# Patient Record
Sex: FEMALE
[De-identification: names, ages, dates, MRNs, and addresses within clinical notes are randomized per-mention and may not be internally consistent; named-entity substitution may affect disease eponyms.]

---

## 2021-03-25 ENCOUNTER — TRANSCRIPTION ENCOUNTER (OUTPATIENT)
Age: 26
End: 2021-03-25

## 2022-12-06 PROBLEM — Z00.00 ENCOUNTER FOR PREVENTIVE HEALTH EXAMINATION: Status: ACTIVE | Noted: 2022-12-06

## 2023-01-24 ENCOUNTER — LABORATORY RESULT (OUTPATIENT)
Age: 28
End: 2023-01-24

## 2023-01-24 ENCOUNTER — APPOINTMENT (OUTPATIENT)
Dept: GASTROENTEROLOGY | Facility: CLINIC | Age: 28
End: 2023-01-24
Payer: COMMERCIAL

## 2023-01-24 VITALS
SYSTOLIC BLOOD PRESSURE: 122 MMHG | DIASTOLIC BLOOD PRESSURE: 77 MMHG | BODY MASS INDEX: 18.61 KG/M2 | HEIGHT: 70 IN | HEART RATE: 65 BPM | WEIGHT: 130 LBS

## 2023-01-24 DIAGNOSIS — K59.04 CHRONIC IDIOPATHIC CONSTIPATION: ICD-10-CM

## 2023-01-24 PROCEDURE — 99205 OFFICE O/P NEW HI 60 MIN: CPT

## 2023-02-05 LAB
ANA PAT FLD IF-IMP: NORMAL
ANA SER IF-ACNC: ABNORMAL
BARLEY IGE QN: <0.1 KUA/L
CHERRY IGE QN: <0.1 KUA/L
COW MILK IGE QN: <0.1 KUA/L
CRAB IGE QN: <0.1 KUA/L
DEPRECATED BARLEY IGE RAST QL: 0
DEPRECATED CHERRY IGE RAST QL: 0
DEPRECATED COW MILK IGE RAST QL: 0
DEPRECATED CRAB IGE RAST QL: 0
DEPRECATED EGG WHITE IGE RAST QL: 0
DEPRECATED OAT IGE RAST QL: 0
DEPRECATED PEANUT IGE RAST QL: 0
DEPRECATED RYE IGE RAST QL: 0
DEPRECATED SOYBEAN IGE RAST QL: 0
DEPRECATED WHEAT IGE RAST QL: 0
EGG WHITE IGE QN: <0.1 KUA/L
FOLATE SERPL-MCNC: 11.1 NG/ML
OAT IGE QN: <0.1 KUA/L
PEANUT IGE QN: <0.1 KUA/L
RYE IGE QN: <0.1 KUA/L
SOYBEAN IGE QN: <0.1 KUA/L
TOTAL IGE SMQN RAST: 37 KU/L
TRYPTASE: 2.9 UG/L
VIT B12 SERPL-MCNC: 563 PG/ML
WHEAT IGE QN: <0.1 KUA/L

## 2023-02-10 ENCOUNTER — NON-APPOINTMENT (OUTPATIENT)
Age: 28
End: 2023-02-10

## 2023-02-24 ENCOUNTER — APPOINTMENT (OUTPATIENT)
Dept: GASTROENTEROLOGY | Facility: CLINIC | Age: 28
End: 2023-02-24
Payer: COMMERCIAL

## 2023-02-24 PROCEDURE — 99215 OFFICE O/P EST HI 40 MIN: CPT | Mod: 95

## 2023-02-24 NOTE — ASSESSMENT
[FreeTextEntry1] : 27F presents for GI eval EXTREME BLOATING BURPING CONSTIPIATION TRAPPED GAS PAIN s/p  SIBO with recurrence 2021 and possible food poisoning induced abdominal pain and constipation found to have ?c diff on GI map, +LUIS\par - rheumatology referral\par - journal symptoms x 2wks\par - prokinetic trial , r/b/a/i discussed and patient agreeable\par - f/u in march

## 2023-02-24 NOTE — REASON FOR VISIT
[Initial Evaluation] : an initial evaluation [Home] : at home, [unfilled] , at the time of the visit. [Medical Office: ([facility])___] : at the medical office located in  [Patient] : the patient [Self] : self

## 2023-02-24 NOTE — HISTORY OF PRESENT ILLNESS
[FreeTextEntry1] : 27F presents for GI eval\par 2/24/23\par - +LUIS\par - rashes under her arm saw derm for it\par - bloated all the time, wakes up bloated \par - did cscope prep didn’t help\par - TRIO SMART NEGATIVE\par - IBS SMART NEGATIVE\par \par PREVIOUS HX\par before 2020 NO GI ISSUES\par lot of antibiotics throughout teens \par april 2020  out of nowhere EXTREME BLOATING BURPING\par then june 2020 stool test US H pylori  then took TRIPLE THERAPY then NEGATIVE for HP\par then saw GI in [place] EGD nml\par then did SIBO test=XIFAXAN FELT BETTER ATE NORMALLY; 100% normal for 6mo-1 year\par then 2021 SEVERE BLOATING 24/7 all food bothers her\par \par 2021 XIFAXAN did nothing \par jan 2022 dr baker retest sibo +HYDROGEN (have the test results) --> LOW FODMAP didn’t help\par may 2022 ?mild food poisoning,  candibactin AR+ BR\par then TUNA BURGER + FRENFCH ONION SOUP -->BAD ABDOMINAL PAIN\par started having constipation (usually was more diarrhea)\par GI MAP = c diff, high zonulin\par +LUIS\par lots of herbs then OCT 2022 after drinking EXTREME ABD PAIN AND DIARRHEA for 2 days SMELLED AWFUL ROTTEN EGGS 2-3days \par NOV 2022 naturopath rx xifaxan + flagyl= NO RELIEF THE WORST ITS EVER BEEN\par do cscope prep did HELP TEMPORARILY \par NOW\par #1 EXTREME BLOATING\par #2. BRISTOL 1-7, has to take baljinder, mg. LITTLE BITS COME OUT\par #3 PAIN TRAPPED GAS LOWER ABD/PELVIS\par not eating, tired after eating \par vaginal, , LYME DISEASE at age 5 IV ATB, ATB FOR ACNE, no ED, athlete not extreme goal 135, high school college ETOH/marijuana, \par FHX- mom liposarcoma\par no abd or pelvic surgeries \par last antibiotics in JANUARY \par \par current rx= motilpro, spironolactone, collagen, lolooestrin, mg citrate \par \par ROS: as above with additional details below if needed\par Constitutional: No weight loss, fevers\par ENT: No deafness\par Eyes: No blindness\par Neck: No lymphadenopathy\par Chest: No shortness of breath,  cough\par Cardiac: No chest pain, palpitations\par Vascular: No leg swelling\par GI: No abdominal pain, nausea, vomiting, diarrhea, constipation, rectal bleeding, melena, dysphagia, unless otherwise noted in HPI\par : No dysuria, dark urine\par Skin: No rashes, lesions, jaundice\par Heme: No bleeding\par Endocrine: No DM unless otherwise stated in HPI\par \par Physical Exam:\par VS noted below\par General: alert, comfortable,in no acute stress\par Eyes: normal sclera, anicteric\par Neck: normal, supple, no neck mass\par Pulm: no respiratory distress\par Heart: s1s2\par Abd: soft, non-tender, non-distended, normal bowel sounds; no appreciable hepatosplenomegaly, no masses palpated\par Ext: warm and well perfused, no edema\par Skin: no rashes, no jaundice\par Neuro: alert, grossly nonfocal\par \par Labs/imaging/prior endoscopic results were reviewed to the extent available and pertinent findings noted\par \par

## 2023-03-10 ENCOUNTER — TRANSCRIPTION ENCOUNTER (OUTPATIENT)
Age: 28
End: 2023-03-10

## 2023-03-28 ENCOUNTER — APPOINTMENT (OUTPATIENT)
Dept: GASTROENTEROLOGY | Facility: CLINIC | Age: 28
End: 2023-03-28
Payer: COMMERCIAL

## 2023-03-28 VITALS
DIASTOLIC BLOOD PRESSURE: 79 MMHG | OXYGEN SATURATION: 100 % | RESPIRATION RATE: 18 BRPM | BODY MASS INDEX: 18.61 KG/M2 | HEART RATE: 61 BPM | HEIGHT: 70 IN | SYSTOLIC BLOOD PRESSURE: 120 MMHG | WEIGHT: 130 LBS

## 2023-03-28 PROCEDURE — 99215 OFFICE O/P EST HI 40 MIN: CPT

## 2023-03-29 ENCOUNTER — TRANSCRIPTION ENCOUNTER (OUTPATIENT)
Age: 28
End: 2023-03-29

## 2023-03-29 NOTE — HISTORY OF PRESENT ILLNESS
[FreeTextEntry1] : 27F presents for GI eval\par 3/28/23\par - magnesium citrate 150mg\par - tried motegrity and was extreme\par - seeing rheum 4/13\par \par 2/24/23\par - +LUIS\par - rashes under her arm saw derm for it\par - bloated all the time, wakes up bloated \par - did cscope prep didn’t help\par - TRIO SMART NEGATIVE\par - IBS SMART NEGATIVE\par \par PREVIOUS HX\par before 2020 NO GI ISSUES\par lot of antibiotics throughout teens \par april 2020  out of nowhere EXTREME BLOATING BURPING\par then june 2020 stool test US H pylori  then took TRIPLE THERAPY then NEGATIVE for HP\par then saw GI in [place] EGD nml\par then did SIBO test=XIFAXAN FELT BETTER ATE NORMALLY; 100% normal for 6mo-1 year\par then 2021 SEVERE BLOATING 24/7 all food bothers her\par \par 2021 XIFAXAN did nothing \par jan 2022 dr baker retest sibo +HYDROGEN (have the test results) --> LOW FODMAP didn’t help\par may 2022 ?mild food poisoning,  candibactin AR+ BR\par then TUNA BURGER + FRENFCH ONION SOUP -->BAD ABDOMINAL PAIN\par started having constipation (usually was more diarrhea)\par GI MAP = c diff, high zonulin\par +LUIS\par lots of herbs then OCT 2022 after drinking EXTREME ABD PAIN AND DIARRHEA for 2 days SMELLED AWFUL ROTTEN EGGS 2-3days \par NOV 2022 naturopath rx xifaxan + flagyl= NO RELIEF THE WORST ITS EVER BEEN\par do cscope prep did HELP TEMPORARILY \par NOW\par #1 EXTREME BLOATING\par #2. BRISTOL 1-7, has to take baljinder, mg. LITTLE BITS COME OUT\par #3 PAIN TRAPPED GAS LOWER ABD/PELVIS\par not eating, tired after eating \par vaginal, , LYME DISEASE at age 5 IV ATB, ATB FOR ACNE, no ED, athlete not extreme goal 135, high school college ETOH/marijuana, \par FHX- mom liposarcoma\par no abd or pelvic surgeries \par last antibiotics in JANUARY \par \par current rx= motilpro, spironolactone, collagen, lolooestrin, mg citrate \par \par ROS: as above with additional details below if needed\par Constitutional: No weight loss, fevers\par ENT: No deafness\par Eyes: No blindness\par Neck: No lymphadenopathy\par Chest: No shortness of breath,  cough\par Cardiac: No chest pain, palpitations\par Vascular: No leg swelling\par GI: No abdominal pain, nausea, vomiting, diarrhea, constipation, rectal bleeding, melena, dysphagia, unless otherwise noted in HPI\par : No dysuria, dark urine\par Skin: No rashes, lesions, jaundice\par Heme: No bleeding\par Endocrine: No DM unless otherwise stated in HPI\par \par Physical Exam:\par VS noted below\par General: alert, comfortable,in no acute stress\par Eyes: normal sclera, anicteric\par Neck: normal, supple, no neck mass\par Pulm: no respiratory distress\par Heart: s1s2\par Abd: soft, non-tender, non-distended, normal bowel sounds; no appreciable hepatosplenomegaly, no masses palpated\par Ext: warm and well perfused, no edema\par Skin: no rashes, no jaundice\par Neuro: alert, grossly nonfocal\par \par Labs/imaging/prior endoscopic results were reviewed to the extent available and pertinent findings noted\par \par

## 2023-03-29 NOTE — ASSESSMENT
[FreeTextEntry1] : 27F presents for GI eval EXTREME BLOATING BURPING CONSTIPIATION TRAPPED GAS PAIN s/p  SIBO with recurrence 2021 and possible food poisoning induced abdominal pain and constipation found to have ?c diff on GI map, +LUIS\par - rheumatology referral\par - MRE to eval IBD in setting of +LUIS\par - consider egd, cscope but pt hesitant\par - optimize intestinal permeability\par - prokinetic trial - motegrity too harsh\par - f/u 6wks

## 2023-04-13 ENCOUNTER — APPOINTMENT (OUTPATIENT)
Dept: RHEUMATOLOGY | Facility: CLINIC | Age: 28
End: 2023-04-13
Payer: COMMERCIAL

## 2023-04-13 ENCOUNTER — NON-APPOINTMENT (OUTPATIENT)
Age: 28
End: 2023-04-13

## 2023-04-13 VITALS
OXYGEN SATURATION: 97 % | SYSTOLIC BLOOD PRESSURE: 127 MMHG | HEART RATE: 74 BPM | WEIGHT: 127 LBS | TEMPERATURE: 97.2 F | BODY MASS INDEX: 18.18 KG/M2 | DIASTOLIC BLOOD PRESSURE: 81 MMHG | HEIGHT: 70 IN

## 2023-04-13 DIAGNOSIS — R76.8 OTHER SPECIFIED ABNORMAL IMMUNOLOGICAL FINDINGS IN SERUM: ICD-10-CM

## 2023-04-13 DIAGNOSIS — R14.0 ABDOMINAL DISTENSION (GASEOUS): ICD-10-CM

## 2023-04-13 PROCEDURE — 36415 COLL VENOUS BLD VENIPUNCTURE: CPT

## 2023-04-13 PROCEDURE — 99204 OFFICE O/P NEW MOD 45 MIN: CPT | Mod: 25

## 2023-04-13 NOTE — ASSESSMENT
[FreeTextEntry1] : 27-year-old woman referred for rheumatology evaluation.  Patient following closely with gastroenterologist for evaluation of abdominal symptoms, bloating and constipation, noted to have positive LUIS on recent blood test.  Patient scheduled for MR enterography next week for further evaluation.  Patient with LUIS positive 1: 160 with dense fine speckled pattern, with reports of symptoms suggestive of Raynaud's like color changes of the fingers.  Patient otherwise does not meet criteria for an underlying connective tissue disease, LUIS positive with a dense fine speckled pattern; this pattern suggest the presence of DFS 70 antibody which is a low prevalence in systemic autoimmune rheumatic diseases.  Given LUIS positive and Raynaud's-like symptoms, will obtain further testing today with AVISE CTD testing in office, further management pending results.

## 2023-04-13 NOTE — HISTORY OF PRESENT ILLNESS
[FreeTextEntry1] : April 13, 2023\par Patient referred for rheumatology evaluation\par Patient evaluated by GI\par Recently noted to have positive LUIS in July 2020 with 1: 640 dense fine speckled pattern\par Patient with repeat in January 2023 \par Patient reports GI symptoms on and off since 2020\par Diagnosed with both H pylori and sibo, status post treatment\par \par Next step, MR enterography scheduled for next week\par Feeling anxious regarding test results and further testing\par GI symptoms include feeling bloated and uncomfortable \par No food allergies noted\par Started magnesium, ginger, and artichoke and if feels not helping, take motegrity small dose\par Having bowl movements but incomplete\par Previously regular since May of last year\par \par No other symptoms, predominantly bloating, gas, and abdominal pain\par Takes OCP does not have periods\par Does not take nsaids\par Eats a good diet\par Tries to remain gluten free and dairy free, although allergy testing negative, does not feel better\par \par Possibly diagnosis of leaky gut, recently started GI revive\par Maybe mild Crohn's, awaiting MR enterography\par Blood tests normal\par \par Exercise does not make a difference in terms of GI symptoms\par +weight loss due to restrictive eating\par Normal weight 135-140, now 127 and 5'10\par \par Sister with GI issues, thinks related to anxiety more diarrhea\par No family history of IBD, SLE or RA\par \par Mother with DSAP\par Patient reports sometimes hands turn white in the extreme cold\par \par Previously treated with  accutane\par Has been taking spironolactone, three years \par lo lo estrin\par \par No photosensitivity\par no oral ulcers\par No sicca symptoms\par No hair loss\par No joint swelling\par Sometimes when previously taking Accutane pain in the hands\par No dysphagia\par no GERD\par \par motegrity as needed

## 2023-04-13 NOTE — PHYSICAL EXAM
[General Appearance - Alert] : alert [General Appearance - In No Acute Distress] : in no acute distress [General Appearance - Well-Appearing] : healthy appearing [Sclera] : the sclera and conjunctiva were normal [Examination Of The Oral Cavity] : the lips and gums were normal [Oropharynx] : the oropharynx was normal [Exaggerated Use Of Accessory Muscles For Inspiration] : no accessory muscle use [Edema] : there was no peripheral edema [Abnormal Walk] : normal gait [Nail Clubbing] : no clubbing  or cyanosis of the fingernails [Musculoskeletal - Swelling] : no joint swelling seen [Motor Tone] : muscle strength and tone were normal [] : no rash [Oriented To Time, Place, And Person] : oriented to person, place, and time [Impaired Insight] : insight and judgment were intact [Affect] : the affect was normal [FreeTextEntry1] : No active synovitis of the upper and lower extremities bilaterally.

## 2023-04-19 ENCOUNTER — RESULT REVIEW (OUTPATIENT)
Age: 28
End: 2023-04-19

## 2023-04-19 ENCOUNTER — OUTPATIENT (OUTPATIENT)
Dept: OUTPATIENT SERVICES | Facility: HOSPITAL | Age: 28
LOS: 1 days | End: 2023-04-19

## 2023-04-19 ENCOUNTER — APPOINTMENT (OUTPATIENT)
Dept: MRI IMAGING | Facility: CLINIC | Age: 28
End: 2023-04-19
Payer: COMMERCIAL

## 2023-04-19 PROCEDURE — 74183 MRI ABD W/O CNTR FLWD CNTR: CPT | Mod: 26

## 2023-04-19 PROCEDURE — 72197 MRI PELVIS W/O & W/DYE: CPT | Mod: 26

## 2023-04-22 ENCOUNTER — TRANSCRIPTION ENCOUNTER (OUTPATIENT)
Age: 28
End: 2023-04-22

## 2023-04-24 ENCOUNTER — NON-APPOINTMENT (OUTPATIENT)
Age: 28
End: 2023-04-24

## 2023-04-24 LAB
ANTI-BETA2 GLYCOPROTEIN 1 IGG CONCENTRATION: 3 U/ML
ANTI-BETA2 GLYCOPROTEIN 1 IGM CONCENTRATION: <2.4 U/ML
ANTI-CARDIOLIPIN IGG CONCENTRATION: 1 GPL
ANTI-CARDIOLIPIN IGM CONCENTRATION: 1 MPL
ANTI-CENP IGG CONCENTRATION: <0.4 U/ML
ANTI-CYCLIC CITRULLINATED PEPTIDE IGG CONCENTRATION: 1 U/ML
ANTI-DOUBLE-STRANDED DNA IGG CONCENTRATION: 17 IU/ML
ANTI-JO-1 IGG CONCENTRATION: <0.3 U/ML
ANTI-NUCLEAR ANTIBODIES - CYTOPLASMIC PATTERN: NORMAL
ANTI-NUCLEAR ANTIBODIES - PRIMARY NUCLEAR PATTERN: NORMAL
ANTI-NUCLEAR ANTIBODIES - PRIMARY PATTERN TITER: ABNORMAL
ANTI-NUCLEAR ANTIBODIES IGG CONCENTRATION: 15 UNITS
ANTI-RNA POL III IGG CONCENTRATION: <0.7 U/ML
ANTI-RNP70 IGG CONCENTRATION: 3 U/ML
ANTI-RO52 IGG CONCENTRATION: <0.3 U/ML
ANTI-RO60 IGG CONCENTRATION: <0.4 U/ML
ANTI-SCL-70 IGG CONCENTRATION: <0.6 U/ML
ANTI-SMITH IGG CONCENTRATION: <0.7 U/ML
ANTI-SS-B (LA) IGG CONCENTRATION: <0.4 U/ML
ANTI-THYROGLOBULIN IGG CONCENTRATION: 13 IU/ML
ANTI-THYROID PEROXIDASE IGG CONCENTRATION: <4 IU/ML
ANTI-U1RNP IGG CONCENTRATION: 3 U/ML
AVISE LUPUS INDEX: -3
AVISE LUPUS RESULT: NEGATIVE
B-LYMPHOCYTE-BOUND C4D (BC4D) LEVEL: 11
ERYTHROCYTE-BOUND C4D (EC4D) LEVEL: 2
RHEUMATOID FACTOR (IGA) CONCENTRATION: 2 IU/ML
RHEUMATOID FACTOR (IGM) CONCENTRATION: <0.6 IU/ML

## 2023-04-25 ENCOUNTER — TRANSCRIPTION ENCOUNTER (OUTPATIENT)
Age: 28
End: 2023-04-25

## 2023-04-27 ENCOUNTER — APPOINTMENT (OUTPATIENT)
Dept: GASTROENTEROLOGY | Facility: CLINIC | Age: 28
End: 2023-04-27

## 2023-04-28 ENCOUNTER — APPOINTMENT (OUTPATIENT)
Dept: GASTROENTEROLOGY | Facility: CLINIC | Age: 28
End: 2023-04-28
Payer: COMMERCIAL

## 2023-04-28 PROCEDURE — 99214 OFFICE O/P EST MOD 30 MIN: CPT | Mod: 95

## 2023-05-01 NOTE — REASON FOR VISIT
[Home] : at home, [unfilled] , at the time of the visit. [Medical Office: ([facility])___] : at the medical office located in  [Patient] : the patient [Self] : self [Follow-up] : a follow-up of an existing diagnosis

## 2023-05-01 NOTE — ASSESSMENT
[FreeTextEntry1] : 27F presents for GI eval EXTREME BLOATING BURPING CONSTIPIATION TRAPPED GAS PAIN s/p  SIBO with recurrence 2021 and possible food poisoning induced abdominal pain and constipation found to have ?c diff on GI map, +LUIS\par - rheumatology referral negative\par - MRE to eval IBD in setting of +LUIS\par - cscope; r/b/a/i discussed and patient agreeabl (EGD PERFORMED)\par - optimize intestinal permeability\par - prokinetic trial - motegrity too effective but uses as needed\par - f/u after above

## 2023-05-01 NOTE — HISTORY OF PRESENT ILLNESS
[FreeTextEntry1] : 27F presents for GI eval\par 4/28/23\par - rheum workup negative\par - not feeling well\par \par \par 3/28/23\par - magnesium citrate 150mg\par - tried motegrity and was extreme\par - seeing rheum 4/13\par \par 2/24/23\par - +LUIS\par - rashes under her arm saw derm for it\par - bloated all the time, wakes up bloated \par - did cscope prep didn’t help\par - TRIO SMART NEGATIVE\par - IBS SMART NEGATIVE\par \par PREVIOUS HX\par before 2020 NO GI ISSUES\par lot of antibiotics throughout teens \par april 2020  out of nowhere EXTREME BLOATING BURPING\par then june 2020 stool test US H pylori  then took TRIPLE THERAPY then NEGATIVE for HP\par then saw GI in [place] EGD nml\par then did SIBO test=XIFAXAN FELT BETTER ATE NORMALLY; 100% normal for 6mo-1 year\par then 2021 SEVERE BLOATING 24/7 all food bothers her\par \par 2021 XIFAXAN did nothing \par jan 2022 dr baker retest sibo +HYDROGEN (have the test results) --> LOW FODMAP didn’t help\par may 2022 ?mild food poisoning,  candibactin AR+ BR\par then TUNA BURGER + FRENFCH ONION SOUP -->BAD ABDOMINAL PAIN\par started having constipation (usually was more diarrhea)\par GI MAP = c diff, high zonulin\par +LUIS\par lots of herbs then OCT 2022 after drinking EXTREME ABD PAIN AND DIARRHEA for 2 days SMELLED AWFUL ROTTEN EGGS 2-3days \par NOV 2022 naturopath rx xifaxan + flagyl= NO RELIEF THE WORST ITS EVER BEEN\par do cscope prep did HELP TEMPORARILY \par NOW\par #1 EXTREME BLOATING\par #2. BRISTOL 1-7, has to take baljinder, mg. LITTLE BITS COME OUT\par #3 PAIN TRAPPED GAS LOWER ABD/PELVIS\par not eating, tired after eating \par vaginal, , LYME DISEASE at age 5 IV ATB, ATB FOR ACNE, no ED, athlete not extreme goal 135, high school college ETOH/marijuana, \par FHX- mom liposarcoma\par no abd or pelvic surgeries \par last antibiotics in JANUARY \par \par current rx= motilpro, spironolactone, collagen, lolooestrin, mg citrate \par \par ROS: as above with additional details below if needed\par Constitutional: No weight loss, fevers\par ENT: No deafness\par Eyes: No blindness\par Neck: No lymphadenopathy\par Chest: No shortness of breath,  cough\par Cardiac: No chest pain, palpitations\par Vascular: No leg swelling\par GI: No abdominal pain, nausea, vomiting, diarrhea, constipation, rectal bleeding, melena, dysphagia, unless otherwise noted in HPI\par : No dysuria, dark urine\par Skin: No rashes, lesions, jaundice\par Heme: No bleeding\par Endocrine: No DM unless otherwise stated in HPI\par \par Physical Exam:\par VS noted below\par General: alert, comfortable,in no acute stress\par Eyes: normal sclera, anicteric\par Neck: normal, supple, no neck mass\par Pulm: no respiratory distress\par Heart: s1s2\par Abd: soft, non-tender, non-distended, normal bowel sounds; no appreciable hepatosplenomegaly, no masses palpated\par Ext: warm and well perfused, no edema\par Skin: no rashes, no jaundice\par Neuro: alert, grossly nonfocal\par \par Labs/imaging/prior endoscopic results were reviewed to the extent available and pertinent findings noted\par \par

## 2023-05-08 ENCOUNTER — TRANSCRIPTION ENCOUNTER (OUTPATIENT)
Age: 28
End: 2023-05-08

## 2023-05-08 DIAGNOSIS — B49 UNSPECIFIED MYCOSIS: ICD-10-CM

## 2023-05-11 ENCOUNTER — TRANSCRIPTION ENCOUNTER (OUTPATIENT)
Age: 28
End: 2023-05-11

## 2023-05-12 ENCOUNTER — TRANSCRIPTION ENCOUNTER (OUTPATIENT)
Age: 28
End: 2023-05-12

## 2023-05-12 RX ORDER — NYSTATIN 100000 [USP'U]/ML
100000 SUSPENSION ORAL 3 TIMES DAILY
Qty: 1 | Refills: 0 | Status: DISCONTINUED | COMMUNITY
Start: 2023-05-08 | End: 2023-05-12

## 2023-05-15 ENCOUNTER — TRANSCRIPTION ENCOUNTER (OUTPATIENT)
Age: 28
End: 2023-05-15

## 2023-05-25 ENCOUNTER — APPOINTMENT (OUTPATIENT)
Dept: GASTROENTEROLOGY | Facility: CLINIC | Age: 28
End: 2023-05-25

## 2023-05-25 NOTE — REASON FOR VISIT
[Follow-up] : a follow-up of an existing diagnosis [Home] : at home, [unfilled] , at the time of the visit. [Medical Office: ([facility])___] : at the medical office located in  [Patient] : the patient [Self] : self

## 2023-05-25 NOTE — HISTORY OF PRESENT ILLNESS
[FreeTextEntry1] : 27F presents for GI eval\par \par 5/25/23\par - finishing up 2 week course nystatin and not noticing much difference, has not gotten worse\par - magnesium and baljinder pills for constipation- two pills did not help. 3 magnesium (150mg), 1motilpro and 1 baljinder and artichoke pill\par - going every day but still feels backed up and not complete\par - still bloating \par - slowly brought back gluten and diary, did not notice difference\par - scheduled for colonoscopy on 6/5/23 \par \par 4/28/23\par - rheum workup negative\par - not feeling well\par \par 3/28/23\par - magnesium citrate 150mg\par - tried motegrity and was extreme\par - seeing rheum 4/13\par \par 2/24/23\par - +LUIS\par - rashes under her arm saw derm for it\par - bloated all the time, wakes up bloated \par - did cscope prep didn’t help\par - TRIO SMART NEGATIVE\par - IBS SMART NEGATIVE\par \par PREVIOUS HX\par before 2020 NO GI ISSUES\par lot of antibiotics throughout teens \par april 2020  out of nowhere EXTREME BLOATING BURPING\par then june 2020 stool test US H pylori  then took TRIPLE THERAPY then NEGATIVE for HP\par then saw GI in [place] EGD nml\par then did SIBO test=XIFAXAN FELT BETTER ATE NORMALLY; 100% normal for 6mo-1 year\par then 2021 SEVERE BLOATING 24/7 all food bothers her\par \par 2021 XIFAXAN did nothing \par jan 2022 dr baker retest sibo +HYDROGEN (have the test results) --> LOW FODMAP didn’t help\par may 2022 ?mild food poisoning,  candibactin AR+ BR\par then TUNA BURGER + FRENFCH ONION SOUP -->BAD ABDOMINAL PAIN\par started having constipation (usually was more diarrhea)\par GI MAP = c diff, high zonulin\par +LUIS\par lots of herbs then OCT 2022 after drinking EXTREME ABD PAIN AND DIARRHEA for 2 days SMELLED AWFUL ROTTEN EGGS 2-3days \par NOV 2022 naturopath rx xifaxan + flagyl= NO RELIEF THE WORST ITS EVER BEEN\par do cscope prep did HELP TEMPORARILY \par NOW\par #1 EXTREME BLOATING\par #2. BRISTOL 1-7, has to take baljinder, mg. LITTLE BITS COME OUT\par #3 PAIN TRAPPED GAS LOWER ABD/PELVIS\par not eating, tired after eating \par vaginal, , LYME DISEASE at age 5 IV ATB, ATB FOR ACNE, no ED, athlete not extreme goal 135, high school college ETOH/marijuana, \par FHX- mom liposarcoma\par no abd or pelvic surgeries \par last antibiotics in JANUARY \par \par current rx= motilpro, spironolactone, collagen, lolooestrin, mg citrate \par \par ROS: as above with additional details below if needed\par Constitutional: No weight loss, fevers\par ENT: No deafness\par Eyes: No blindness\par Neck: No lymphadenopathy\par Chest: No shortness of breath,  cough\par Cardiac: No chest pain, palpitations\par Vascular: No leg swelling\par GI: No abdominal pain, nausea, vomiting, diarrhea, constipation, rectal bleeding, melena, dysphagia, unless otherwise noted in HPI\par : No dysuria, dark urine\par Skin: No rashes, lesions, jaundice\par Heme: No bleeding\par Endocrine: No DM unless otherwise stated in HPI\par \par Physical Exam:\par VS noted below\par General: alert, comfortable,in no acute stress\par Eyes: normal sclera, anicteric\par Neck: normal, supple, no neck mass\par Pulm: no respiratory distress\par Heart: s1s2\par Abd: soft, non-tender, non-distended, normal bowel sounds; no appreciable hepatosplenomegaly, no masses palpated\par Ext: warm and well perfused, no edema\par Skin: no rashes, no jaundice\par Neuro: alert, grossly nonfocal\par \par Labs/imaging/prior endoscopic results were reviewed to the extent available and pertinent findings noted\par \par

## 2023-05-26 ENCOUNTER — APPOINTMENT (OUTPATIENT)
Dept: GASTROENTEROLOGY | Facility: CLINIC | Age: 28
End: 2023-05-26
Payer: COMMERCIAL

## 2023-05-26 ENCOUNTER — TRANSCRIPTION ENCOUNTER (OUTPATIENT)
Age: 28
End: 2023-05-26

## 2023-05-26 PROCEDURE — 99213 OFFICE O/P EST LOW 20 MIN: CPT | Mod: 95

## 2023-06-05 ENCOUNTER — APPOINTMENT (OUTPATIENT)
Age: 28
End: 2023-06-05
Payer: COMMERCIAL

## 2023-06-05 ENCOUNTER — RESULT REVIEW (OUTPATIENT)
Age: 28
End: 2023-06-05

## 2023-06-05 PROCEDURE — 45378 DIAGNOSTIC COLONOSCOPY: CPT

## 2023-06-14 NOTE — ASSESSMENT
[FreeTextEntry1] : 27F presents for GI eval EXTREME BLOATING BURPING CONSTIPIATION TRAPPED GAS PAIN s/p  SIBO with recurrence 2021 and possible food poisoning induced abdominal pain and constipation found to have ?c diff on GI map, +LUIS\par - rheumatology referral negative\par - MRE to eval IBD in setting of +LUIS = rectum large amount of stool ,liver hemangioma\par - cscope; r/b/a/i discussed and patient agreeabl (EGD PERFORMED)\par - optimize intestinal permeability\par - prokinetic trial - motegrity too effective but uses as needed\par - f/u after cscope; pt understands i am leaving practice in june therefore to f/u with me in new practice or with gaurav colleague and to get f/u appt asap so gets seen in a timely fashion, pt understands so does not get lost to follow up\par

## 2023-06-14 NOTE — REASON FOR VISIT
[Follow-up] : a follow-up of an existing diagnosis [Home] : at home, [unfilled] , at the time of the visit. [Medical Office: ([facility])___] : at the medical office located in  [Patient] : the patient [Self] : self

## 2023-06-29 ENCOUNTER — TRANSCRIPTION ENCOUNTER (OUTPATIENT)
Age: 28
End: 2023-06-29

## 2024-04-18 ENCOUNTER — APPOINTMENT (OUTPATIENT)
Dept: ENDOCRINOLOGY | Facility: CLINIC | Age: 29
End: 2024-04-18
Payer: COMMERCIAL

## 2024-04-18 VITALS
HEART RATE: 75 BPM | WEIGHT: 135 LBS | BODY MASS INDEX: 19.37 KG/M2 | SYSTOLIC BLOOD PRESSURE: 120 MMHG | DIASTOLIC BLOOD PRESSURE: 72 MMHG

## 2024-04-18 DIAGNOSIS — L70.9 ACNE, UNSPECIFIED: ICD-10-CM

## 2024-04-18 PROCEDURE — 99204 OFFICE O/P NEW MOD 45 MIN: CPT

## 2024-04-18 RX ORDER — NORETHINDRONE ACETATE AND ETHINYL ESTRADIOL, ETHINYL ESTRADIOL AND FERROUS FUMARATE 1MG-10(24)
KIT ORAL
Refills: 0 | Status: ACTIVE | COMMUNITY

## 2024-04-18 RX ORDER — PRUCALOPRIDE 2 MG/1
2 TABLET, FILM COATED ORAL
Qty: 30 | Refills: 2 | Status: DISCONTINUED | COMMUNITY
Start: 2023-01-24 | End: 2024-04-18

## 2024-04-18 RX ORDER — SPIRONOLACTONE 50 MG/1
TABLET ORAL
Refills: 0 | Status: ACTIVE | COMMUNITY

## 2024-04-18 RX ORDER — SODIUM SULFATE, MAGNESIUM SULFATE, AND POTASSIUM CHLORIDE 17.75; 2.7; 2.25 G/1; G/1; G/1
1479-225-188 TABLET ORAL
Qty: 24 | Refills: 0 | Status: DISCONTINUED | COMMUNITY
Start: 2023-05-31 | End: 2024-04-18

## 2024-04-18 RX ORDER — LINACLOTIDE 72 UG/1
72 CAPSULE, GELATIN COATED ORAL DAILY
Qty: 30 | Refills: 2 | Status: DISCONTINUED | COMMUNITY
Start: 2023-03-03 | End: 2024-04-18

## 2024-04-18 RX ORDER — NYSTATIN 500000 [USP'U]/1
500000 TABLET, FILM COATED ORAL
Qty: 56 | Refills: 3 | Status: DISCONTINUED | COMMUNITY
Start: 2023-05-12 | End: 2024-04-18

## 2024-04-18 NOTE — ASSESSMENT
[FreeTextEntry1] : Acne. We will send laboratory evaluation as below. I recommend she continue a combined oral contraceptive pill and spironolactone for now if her acne is in good control with her current regimen. Androgen levels will most likely be within the normal range on her current therapy. We can consider further evaluation for polycystic ovarian syndrome if she has oligomenorrhea after discontinuation of a combined oral contraceptive pill.   CC: Dr. Lee Ann Peter, Fax [phone]

## 2024-04-18 NOTE — PHYSICAL EXAM
[Alert] : alert [Healthy Appearance] : healthy appearance [No Acute Distress] : no acute distress [Normal Sclera/Conjunctiva] : normal sclera/conjunctiva [Normal Hearing] : hearing was normal [No Neck Mass] : no neck mass was observed [No LAD] : no lymphadenopathy [Supple] : the neck was supple [Thyroid Not Enlarged] : the thyroid was not enlarged [No Respiratory Distress] : no respiratory distress [Clear to Auscultation] : lungs were clear to auscultation bilaterally [Normal S1, S2] : normal S1 and S2 [Normal Rate] : heart rate was normal [Regular Rhythm] : with a regular rhythm [Kyphosis] : no kyphosis present [No Stigmata of Cushings Syndrome] : no stigmata of Cushings Syndrome [Normal Gait] : normal gait [Acanthosis Nigricans] : no acanthosis nigricans [Acne] : no acne [Hirsutism] : no hirsutism [Normal Insight/Judgement] : insight and judgment were intact [de-identified] : no moon facies, no supraclavicular fat pads

## 2024-04-18 NOTE — HISTORY OF PRESENT ILLNESS
[FreeTextEntry1] : Ms. Olivo is a 28 year-old woman presenting for endocrine evaluation for acne.   Acne.  She has a history of acne starting in puberty that persisted into young adulthood. She was previously treated with isotretinoin approximately five years ago and started on a combined oral contraceptive pill at that time. She has continued a combined oral contraceptive pill and is currently taking ethinyl estradiol and norethindrone (Lo Loestrin). She has been on spironolactone for many years, at doses ranging from 100 to 200 mg daily.  She was taking spironolactone 100 mg daily, adjusted back to 200 mg daily, and now 100/50 mg daily after she had a flare of acne following light therapy for therapy of acne scarring.  She had occasional missed periods prior to initiation of a combined oral contraceptive pill.  She was previously evaluated by an outside endocrinologist. Prior evaluation for polycystic ovarian syndrome negative per her report.   She has had chronic bloating, diarrhea, and constipation with negative previous evaluation per her report. She has had chronic sleep disturbance. No history of hirsutism.

## 2024-09-04 ENCOUNTER — TRANSCRIPTION ENCOUNTER (OUTPATIENT)
Age: 29
End: 2024-09-04

## 2024-09-11 ENCOUNTER — NON-APPOINTMENT (OUTPATIENT)
Age: 29
End: 2024-09-11

## 2024-09-26 ENCOUNTER — TRANSCRIPTION ENCOUNTER (OUTPATIENT)
Age: 29
End: 2024-09-26